# Patient Record
Sex: MALE | Race: WHITE | NOT HISPANIC OR LATINO | URBAN - METROPOLITAN AREA
[De-identification: names, ages, dates, MRNs, and addresses within clinical notes are randomized per-mention and may not be internally consistent; named-entity substitution may affect disease eponyms.]

---

## 2017-12-13 ENCOUNTER — EMERGENCY (EMERGENCY)
Facility: HOSPITAL | Age: 47
LOS: 1 days | Discharge: LEFT BEFORE DISCHARGE | End: 2017-12-13
Attending: EMERGENCY MEDICINE
Payer: COMMERCIAL

## 2017-12-13 VITALS
HEIGHT: 70 IN | OXYGEN SATURATION: 95 % | DIASTOLIC BLOOD PRESSURE: 81 MMHG | WEIGHT: 149.91 LBS | TEMPERATURE: 98 F | RESPIRATION RATE: 16 BRPM | SYSTOLIC BLOOD PRESSURE: 142 MMHG | HEART RATE: 102 BPM

## 2017-12-13 DIAGNOSIS — R07.89 OTHER CHEST PAIN: ICD-10-CM

## 2017-12-13 DIAGNOSIS — F41.9 ANXIETY DISORDER, UNSPECIFIED: ICD-10-CM

## 2017-12-13 DIAGNOSIS — R07.9 CHEST PAIN, UNSPECIFIED: ICD-10-CM

## 2017-12-13 PROCEDURE — 93005 ELECTROCARDIOGRAM TRACING: CPT

## 2017-12-13 PROCEDURE — 99283 EMERGENCY DEPT VISIT LOW MDM: CPT

## 2017-12-13 PROCEDURE — 99284 EMERGENCY DEPT VISIT MOD MDM: CPT | Mod: 25

## 2017-12-13 RX ORDER — CLONAZEPAM 1 MG
0.5 TABLET ORAL ONCE
Qty: 0 | Refills: 0 | Status: COMPLETED | OUTPATIENT
Start: 2017-12-13 | End: 2017-12-13

## 2017-12-13 RX ORDER — SODIUM CHLORIDE 9 MG/ML
3 INJECTION INTRAMUSCULAR; INTRAVENOUS; SUBCUTANEOUS ONCE
Qty: 0 | Refills: 0 | Status: DISCONTINUED | OUTPATIENT
Start: 2017-12-13 | End: 2017-12-17

## 2017-12-13 NOTE — ED PROVIDER NOTE - PROGRESS NOTE DETAILS
Pt had ekg done and received meds, no longer found to be in bed. Recommended labwork for further eval. Pt eloped from ED.

## 2017-12-13 NOTE — ED PROVIDER NOTE - OBJECTIVE STATEMENT
H/o Anxiety here for chest pressure, palp, sob this week. Pt states he has had similar in the past due to anxiety. Reports recent move to [facility] to be with girlfriend. States move as trigger. Was on Klonopin but states it caused sedation, currently open to med in ED to help with symptoms. Recent stress test this year normal per pt. No leg pain/swelling/dizziness. No SI/hallucinations.

## 2017-12-13 NOTE — ED ADULT NURSE NOTE - OBJECTIVE STATEMENT
pt is here for chest pressure for 3 days.  Denied chest pain or sob, no fever, pt calm with family member.

## 2017-12-13 NOTE — ED ADULT NURSE NOTE - NS ED NURSE ELOPE COMMENTS
pt erna rn was waiting for medications to be verified by pharmacy-upon return to bedside -pt erna. dr saenz aware and left message on phone number on file, no IV access on the pt.

## 2018-02-04 ENCOUNTER — EMERGENCY (EMERGENCY)
Facility: HOSPITAL | Age: 48
LOS: 1 days | Discharge: HOME | End: 2018-02-04
Attending: EMERGENCY MEDICINE
Payer: COMMERCIAL

## 2018-02-04 VITALS
HEART RATE: 62 BPM | DIASTOLIC BLOOD PRESSURE: 73 MMHG | HEIGHT: 62 IN | RESPIRATION RATE: 20 BRPM | SYSTOLIC BLOOD PRESSURE: 117 MMHG | WEIGHT: 139.99 LBS | OXYGEN SATURATION: 98 % | TEMPERATURE: 98 F

## 2018-02-04 VITALS
RESPIRATION RATE: 16 BRPM | DIASTOLIC BLOOD PRESSURE: 79 MMHG | SYSTOLIC BLOOD PRESSURE: 147 MMHG | HEART RATE: 98 BPM | TEMPERATURE: 98 F | OXYGEN SATURATION: 98 %

## 2018-02-04 PROCEDURE — 99284 EMERGENCY DEPT VISIT MOD MDM: CPT

## 2018-02-04 PROCEDURE — 99283 EMERGENCY DEPT VISIT LOW MDM: CPT | Mod: 25

## 2018-02-04 RX ORDER — CLONAZEPAM 1 MG
0.5 TABLET ORAL ONCE
Qty: 0 | Refills: 0 | Status: DISCONTINUED | OUTPATIENT
Start: 2018-02-04 | End: 2018-02-04

## 2018-02-04 RX ORDER — METOCLOPRAMIDE HCL 10 MG
10 TABLET ORAL ONCE
Qty: 0 | Refills: 0 | Status: COMPLETED | OUTPATIENT
Start: 2018-02-04 | End: 2018-02-04

## 2018-02-04 RX ADMIN — Medication 0.5 MILLIGRAM(S): at 14:56

## 2018-02-04 RX ADMIN — Medication 10 MILLIGRAM(S): at 14:56

## 2018-02-04 NOTE — ED PROVIDER NOTE - MEDICAL DECISION MAKING DETAILS
46 y/o M pt is well appearing with stable vitals. Denies SI/HI. Will give Relgan IM for headache. Pt took Excedrin for migraine x2 earlier today. Will also give Klonopin 0.5mg with instructions to f/u with [facility] or [facility]. No prescriptions will be sent to the pharmacy.

## 2018-02-04 NOTE — ED PROVIDER NOTE - OBJECTIVE STATEMENT
48 y/o M pt with a significant PMHx of panic attacks, migraine headaches and no significant PSHx presents to the ED c/o typical onset of migraine headaches (frontal with radiation to the back). Pt also attests to panic attack 2/2 recent move to [facility] and new job and relationship concerns. Pt notes he does not have insurance, so he has not received further medical care and plan of treatment for his symptoms; pt states he used to take Klonopin in the past, but has not taken any in over 1 year. Pt usually resorts to medication and praying. Pt denies nasal congestion, gait abnormalities, nausea, vomiting, head injury or trauma, photophobia, phonophobia, SI/HI or any other complaints. NKDA.

## 2018-02-04 NOTE — ED ADULT NURSE NOTE - OBJECTIVE STATEMENT
Pt from home c/o of panic attack with frontal headache x1 month on and off pt is alert awake oriented x3 anxious no acute respiratory distress noted

## 2018-02-04 NOTE — ED PROVIDER NOTE - ENMT, MLM
Airway patent, Nasal mucosa clear. Mouth with normal mucosa. Throat has no vesicles, no oropharyngeal exudates and uvula is midline. No frontal, maxillary sinus tenderness.

## 2018-02-04 NOTE — ED PROVIDER NOTE - CHPI ED SYMPTOMS NEG
no nasal congestion, no gait abnormalities, no nausea, no vomiting, no head injury or trauma, no photophobia, no phonophobia, no SI/HI

## 2018-02-04 NOTE — ED PROVIDER NOTE - ATTENDING CONTRIBUTION TO CARE
48 y/o M pt is well appearing with stable vitals. Denies SI/HI. Will give Relgan IM for headache. Pt took Excedrin for migraine x2 earlier today. Will also give Klonopin 0.5mg with instructions to f/u with [facility] or [facility]. No prescriptions will be sent to the pharmacy.